# Patient Record
Sex: MALE | Race: WHITE | NOT HISPANIC OR LATINO | URBAN - NONMETROPOLITAN AREA
[De-identification: names, ages, dates, MRNs, and addresses within clinical notes are randomized per-mention and may not be internally consistent; named-entity substitution may affect disease eponyms.]

---

## 2021-04-08 ENCOUNTER — LAB OUTSIDE AN ENCOUNTER (OUTPATIENT)
Dept: URBAN - NONMETROPOLITAN AREA CLINIC 13 | Facility: CLINIC | Age: 51
End: 2021-04-08

## 2021-04-08 ENCOUNTER — OFFICE VISIT (OUTPATIENT)
Dept: URBAN - NONMETROPOLITAN AREA CLINIC 13 | Facility: CLINIC | Age: 51
End: 2021-04-08
Payer: COMMERCIAL

## 2021-04-08 DIAGNOSIS — R19.7 DIARRHEA, UNSPECIFIED TYPE: ICD-10-CM

## 2021-04-08 DIAGNOSIS — Z86.010 PERSONAL HISTORY OF COLONIC POLYPS: ICD-10-CM

## 2021-04-08 PROCEDURE — 99204 OFFICE O/P NEW MOD 45 MIN: CPT | Performed by: INTERNAL MEDICINE

## 2021-04-08 RX ORDER — MELOXICAM 15 MG/1
1 CAPSULE TABLET ORAL ONCE A DAY
Status: ACTIVE | COMMUNITY

## 2021-04-08 RX ORDER — SODIUM PICOSULFATE, MAGNESIUM OXIDE, AND ANHYDROUS CITRIC ACID 10; 3.5; 12 MG/16.2G; G/16.2G; G/16.2G
AS DIRECTED POWDER, METERED ORAL
Qty: 1 | Refills: 0 | Status: ACTIVE | COMMUNITY
Start: 2016-04-28

## 2021-04-08 RX ORDER — COLESTIPOL HYDROCHLORIDE 1 G/1
1 TABLET TABLET ORAL BID
Qty: 60 TABLET | Refills: 3 | OUTPATIENT
Start: 2021-04-08

## 2021-04-08 NOTE — HPI-TODAY'S VISIT:
Patient returns for surveillance colonoscopy due to past history of colon polyps.  In 2006 it sounds like he had a nearly obstructing mass in the sigmoid and underwent resection.  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  Pathology at the time of colonoscopy showed adenomas.  I last did a colonoscopy in 2016 at which time adenoma was removed.  No mention was made however of previous surgery. Patient's bowel movements are typically loose.  He has about 2 semiformed stools after every meal.  The diarrhea does not awaken him from sleep at night.  There is no evidence of bleeding.  There is been no change in his bowel habits.  He denies abdominal pain.  He does have a great deal of gas and bloating.  His weight is down but he has been on a diet.  He has not been anemic or iron deficient.  Patient's gallbladder has been taken out. Patient's reflux is well controlled with Protonix.  He has no chest pain.  He denies dysphagia.  He has no extra esophageal symptoms.

## 2021-05-17 ENCOUNTER — OFFICE VISIT (OUTPATIENT)
Dept: URBAN - METROPOLITAN AREA MEDICAL CENTER 1 | Facility: MEDICAL CENTER | Age: 51
End: 2021-05-17
Payer: COMMERCIAL

## 2021-05-17 DIAGNOSIS — R19.7 ACUTE DIARRHEA: ICD-10-CM

## 2021-05-17 PROCEDURE — 45380 COLONOSCOPY AND BIOPSY: CPT | Performed by: INTERNAL MEDICINE

## 2021-06-01 ENCOUNTER — OFFICE VISIT (OUTPATIENT)
Dept: URBAN - NONMETROPOLITAN AREA CLINIC 2 | Facility: CLINIC | Age: 51
End: 2021-06-01
Payer: COMMERCIAL

## 2021-06-01 ENCOUNTER — WEB ENCOUNTER (OUTPATIENT)
Dept: URBAN - NONMETROPOLITAN AREA CLINIC 2 | Facility: CLINIC | Age: 51
End: 2021-06-01

## 2021-06-01 ENCOUNTER — DASHBOARD ENCOUNTERS (OUTPATIENT)
Age: 51
End: 2021-06-01

## 2021-06-01 DIAGNOSIS — R19.7 DIARRHEA, UNSPECIFIED TYPE: ICD-10-CM

## 2021-06-01 DIAGNOSIS — Z86.010 PERSONAL HISTORY OF COLONIC POLYPS: ICD-10-CM

## 2021-06-01 DIAGNOSIS — K21.9 GERD WITHOUT ESOPHAGITIS: ICD-10-CM

## 2021-06-01 PROBLEM — 266435005: Status: ACTIVE | Noted: 2021-06-01

## 2021-06-01 PROBLEM — 62315008: Status: ACTIVE | Noted: 2021-04-08

## 2021-06-01 PROBLEM — 428283002: Status: ACTIVE | Noted: 2021-04-08

## 2021-06-01 PROCEDURE — 99213 OFFICE O/P EST LOW 20 MIN: CPT | Performed by: INTERNAL MEDICINE

## 2021-06-01 RX ORDER — COLESTIPOL HYDROCHLORIDE 1 G/1
1 TABLET TABLET ORAL BID
Qty: 60 TABLET | Refills: 3 | Status: DISCONTINUED | COMMUNITY
Start: 2021-04-08

## 2021-06-01 RX ORDER — SODIUM PICOSULFATE, MAGNESIUM OXIDE, AND ANHYDROUS CITRIC ACID 10; 3.5; 12 MG/16.2G; G/16.2G; G/16.2G
AS DIRECTED POWDER, METERED ORAL
Qty: 1 | Refills: 0 | Status: DISCONTINUED | COMMUNITY
Start: 2016-04-28

## 2021-06-01 RX ORDER — MELOXICAM 15 MG/1
1 CAPSULE TABLET ORAL ONCE A DAY
Status: ACTIVE | COMMUNITY

## 2021-06-01 NOTE — HPI-TODAY'S VISIT:
4/8/21-Dr. Milan Patient returns for surveillance colonoscopy due to past history of colon polyps.  In 2006 it sounds like he had a nearly obstructing mass in the sigmoid and underwent resection.  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  Pathology at the time of colonoscopy showed adenomas.  I last did a colonoscopy in 2016 at which time adenoma was removed.  No mention was made however of previous surgery. Patient's bowel movements are typically loose.  He has about 2 semiformed stools after every meal.  The diarrhea does not awaken him from sleep at night.  There is no evidence of bleeding.  There is been no change in his bowel habits.  He denies abdominal pain.  He does have a great deal of gas and bloating.  His weight is down but he has been on a diet.  He has not been anemic or iron deficient.  Patient's gallbladder has been taken out. Patient's reflux is well controlled with Protonix.  He has no chest pain.  He denies dysphagia.  He has no extra esophageal symptoms.  6/1/21 Mr. Ch[place] Gaming is a very pleasant 51 YO M who presents for follow up after colonoscopy. Colonoscopy 5/2021 with no polyps, random colon bx with no significant changes. He stopped the colestid as this caused constipation. He continues with loose stools after meals. This is normal for him since his CCY and colon resection x 2. He is accustomed to this bowel pattern and does not want to try any additional medications for the loose stools. Denies rectal bleeding. No abdominal pain. He has reflux that is well controlled on pantoprazole once daily. No dysphagia. His appetite is normal. He has lost a few lbs recently with walking. Otherwise, doing well and has no acute complaints. TG